# Patient Record
Sex: FEMALE | Race: AMERICAN INDIAN OR ALASKA NATIVE
[De-identification: names, ages, dates, MRNs, and addresses within clinical notes are randomized per-mention and may not be internally consistent; named-entity substitution may affect disease eponyms.]

---

## 2017-06-30 NOTE — CAT SCAN REPORT
FINAL REPORT



PROCEDURE:  CT ABDOMEN PELVIS W CON



TECHNIQUE:  Computerized axial tomography of the abdomen and

pelvis was performed after the IV injection of iodinated nonionic

contrast. 



HISTORY:  periumbical pain 



COMPARISON:  No prior studies are available for comparison.



FINDINGS:  

Visualized lower thorax: No significant abnormality.



Liver: Normal size and attenuation.



Spleen: Normal size and attenuation.



Gallbladder and biliary system: Normal.



Pancreas: Normal.



Adrenals: Normal.



Kidneys: Both kidneys have a normal size. No hydronephrosis. No

renal stones or masses.



GI tract: No obstruction. No ileus or enteritis. The cecum,

appendix and colon are normal..



Lymph nodes and mesentery: Normal. 



Vasculature: Normal.



Bladder: Normal.



Reproductive organs: No pelvic masses..



Peritoneum: Minimal fluid identified in the lower pelvis..



Musculoskeletal structures: No significant abnormality.



Other: None.  



IMPRESSION:  

There is no evidence of intestinal or urinary tract obstruction.

Ileus or enteritis. The appendix is normal. There is slight fluid

in the lower pelvis.

## 2017-06-30 NOTE — EMERGENCY DEPARTMENT REPORT
ED Abdominal Pain HPI





- General


Chief Complaint: Abdominal Pain


Stated Complaint: PAINFUL URINATION


Time Seen by Provider: 06/30/17 01:43


Source: patient


Mode of arrival: Ambulatory


Limitations: No Limitations





- History of Present Illness


Initial Comments: 





Pt is a 41 yr old female with a h/o HTN, gastric bypass, hysterectomy, and 

multiple abdominal surgeries who presents with abdominal pain. Pt reports a 

dull periumbical pain for the past two days with associated nausea and 

decreased apetite. Pt is having flatus and normal BM's. Pt also reports vaginal 

discharge that is different from her normal vaginal discharge. Pt is sexually 

active with one partner, no h/o STD. Otherwise no fevers, chills, HA,dizziness, 

vomiting, diarrhea, recent antibiotic use, SOB, CP, trauma, recent surgery, 

trauma, travel, or sick contacts


Severity scale (0 -10): 5





- Related Data


 Home Medications











 Medication  Instructions  Recorded  Confirmed  Last Taken


 


ALBUTEROL Inhaler 2 puff IH Q4H PRN 06/29/17 06/29/17 Unknown


 


Advair Diskus 500-50 mcg 2 puff IH BID 06/29/17 06/29/17 Unknown


 


Ambien 6.5 mg PO HS 06/29/17 06/29/17 Unknown


 


Cyanocobalamin 1 ml IM Q2W 06/29/17 06/29/17 Unknown


 


Flonase 1 puff INTRANASAL DAILY 06/29/17 06/29/17 Unknown


 


Labetalol 100 mg PO BID 06/29/17 06/29/17 Unknown


 


PROzac 40 mg PO DAILY 06/29/17 06/29/17 Unknown


 


Singulair 10 mg PO DAILY 06/29/17 06/29/17 Unknown


 


Xanax TAB 1 mg PO HS PRN 06/29/17 06/29/17 Unknown








 Previous Rx's











 Medication  Instructions  Recorded  Last Taken  Type


 


metroNIDAZOLE [Flagyl] 500 mg PO Q12HR #14 tab 06/30/17 Unknown Rx











 Allergies











Allergy/AdvReac Type Severity Reaction Status Date / Time


 


amlodipine besylate Allergy  Swelling Verified 06/29/17 22:11





[From Norvasc]     


 


aspirin Allergy  Unknown Verified 06/29/17 22:12


 


lisinopril Allergy  Swelling Verified 06/29/17 22:10


 


loratadine [From Claritin] Allergy  Angioedema Verified 06/29/17 22:11


 


nebivolol HCl [From Bystolic] Allergy  Swelling Verified 06/29/17 22:12


 


topiramate [From Topamax] Allergy  Headache Verified 06/29/17 22:10














ED Review of Systems


ROS: 


Stated complaint: PAINFUL URINATION


Other details as noted in HPI





Comment: All other systems reviewed and negative





ED Past Medical Hx





- Past Medical History


Previous Medical History?: Yes


Hx Hypertension: Yes


Hx Asthma: Yes


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





- Surgical History


Additional Surgical History: TUBAL LIGATION, GASTRIC BYPASS,HYSTERECTOMY, 

CERVICAL ABLATION





- Social History


Smoking Status: Never Smoker


Substance Use Type: None





- Medications


Home Medications: 


 Home Medications











 Medication  Instructions  Recorded  Confirmed  Last Taken  Type


 


ALBUTEROL Inhaler 2 puff IH Q4H PRN 06/29/17 06/29/17 Unknown History


 


Advair Diskus 500-50 mcg 2 puff IH BID 06/29/17 06/29/17 Unknown History


 


Ambien 6.5 mg PO HS 06/29/17 06/29/17 Unknown History


 


Cyanocobalamin 1 ml IM Q2W 06/29/17 06/29/17 Unknown History


 


Flonase 1 puff INTRANASAL DAILY 06/29/17 06/29/17 Unknown History


 


Labetalol 100 mg PO BID 06/29/17 06/29/17 Unknown History


 


PROzac 40 mg PO DAILY 06/29/17 06/29/17 Unknown History


 


Singulair 10 mg PO DAILY 06/29/17 06/29/17 Unknown History


 


Xanax TAB 1 mg PO HS PRN 06/29/17 06/29/17 Unknown History


 


metroNIDAZOLE [Flagyl] 500 mg PO Q12HR #14 tab 06/30/17  Unknown Rx














ED Physical Exam





- General


Limitations: No Limitations


General appearance: alert, in no apparent distress





- Head


Head exam: Present: atraumatic, normocephalic





- Eye


Eye exam: Present: normal appearance, PERRL, EOMI


Pupils: Present: normal accommodation





- ENT


ENT exam: Present: mucous membranes moist





- Neck


Neck exam: Present: normal inspection





- Respiratory


Respiratory exam: Present: normal lung sounds bilaterally.  Absent: respiratory 

distress, wheezes, rales, rhonchi, stridor





- Cardiovascular


Cardiovascular Exam: Present: regular rate, normal rhythm.  Absent: systolic 

murmur, diastolic murmur, rubs, gallop





- GI/Abdominal


GI/Abdominal exam: Present: soft, tenderness (mild periumbilical), normal bowel 

sounds.  Absent: distended, guarding, rebound, rigid





- 


External exam: Present: normal external exam.  Absent: erythema, swelling, 

lesions, lacerations, bleeding


Speculum exam: Present: vaginal discharge (yellowish green, moderate amount, 

fishy odor).  Absent: cervical discharge (Patient has no cervix)


Bi-manual exam: Present: normal bi-manual exam.  Absent: adnexal tenderness, 

uterine tenderness





- Extremities Exam


Extremities exam: Present: normal inspection





- Back Exam


Back exam: Present: normal inspection





- Neurological Exam


Neurological exam: Present: alert, oriented X3





- Psychiatric


Psychiatric exam: Present: normal affect, normal mood





- Skin


Skin exam: Present: warm, dry, intact, normal color.  Absent: rash





ED Course


 Vital Signs











  06/29/17 06/30/17





  22:00 01:47


 


Temperature 98.5 F 


 


Pulse Rate 63 63


 


Respiratory 16 17





Rate  


 


Blood Pressure 152/100 


 


Blood Pressure 152/100 146/86





[Left]  


 


O2 Sat by Pulse 100 100





Oximetry  














ED Medical Decision Making





- Lab Data


Result diagrams: 


 06/29/17 22:34





 06/29/17 22:34





- Radiology Data


Radiology results: report reviewed





CT abdomen and pelvis: Slight fluid in the pelvis otherwise no intra-abdominal 

abnormality seen.





- Medical Decision Making





Patient has a moderate amount of yellow green discharge, will treat for G/C, 

trichomona and BV





Urine noted to have greater than 108 WBCs with no leuk esterase or nitrite, his 

WBC is reflective of the yellow green vaginal discharge


Critical care attestation.: 


If time is entered above; I have spent that time in minutes in the direct care 

of this critically ill patient, excluding procedure time.








ED Disposition


Clinical Impression: 


 Abdominal pain, Vaginitis





Disposition: DC-01 TO HOME OR SELFCARE


Is pt being admited?: No


Condition: Stable


Instructions:  Abdominal Pain (ED), Vaginitis (ED)


Prescriptions: 


metroNIDAZOLE [Flagyl] 500 mg PO Q12HR #14 tab


Referrals: 


PRIMARY CARE,MD [Primary Care Provider] - 3-5 Days

## 2018-05-08 ENCOUNTER — HOSPITAL ENCOUNTER (OUTPATIENT)
Dept: HOSPITAL 5 - GIO | Age: 43
Discharge: HOME | End: 2018-05-08
Attending: SPECIALIST
Payer: MEDICAID

## 2018-05-08 VITALS — DIASTOLIC BLOOD PRESSURE: 94 MMHG | SYSTOLIC BLOOD PRESSURE: 155 MMHG

## 2018-05-08 DIAGNOSIS — Z98.51: ICD-10-CM

## 2018-05-08 DIAGNOSIS — Z98.890: ICD-10-CM

## 2018-05-08 DIAGNOSIS — K21.0: ICD-10-CM

## 2018-05-08 DIAGNOSIS — J45.909: ICD-10-CM

## 2018-05-08 DIAGNOSIS — Z86.73: ICD-10-CM

## 2018-05-08 DIAGNOSIS — Z88.5: ICD-10-CM

## 2018-05-08 DIAGNOSIS — G47.33: ICD-10-CM

## 2018-05-08 DIAGNOSIS — K95.89: Primary | ICD-10-CM

## 2018-05-08 DIAGNOSIS — Z98.0: ICD-10-CM

## 2018-05-08 DIAGNOSIS — E11.9: ICD-10-CM

## 2018-05-08 DIAGNOSIS — Z90.710: ICD-10-CM

## 2018-05-08 DIAGNOSIS — Z88.6: ICD-10-CM

## 2018-05-08 DIAGNOSIS — I10: ICD-10-CM

## 2018-05-08 DIAGNOSIS — Z88.8: ICD-10-CM

## 2018-05-08 PROCEDURE — 43235 EGD DIAGNOSTIC BRUSH WASH: CPT

## 2018-05-08 NOTE — ANESTHESIA CONSULTATION
Anesthesia Consult and Med Hx


Date of service: 05/08/18





- Airway


Anesthetic Teeth Evaluation: Good


ROM Head & Neck: Adequate


Mental/Hyoid Distance: Adequate


Mallampati Class: Class II


Intubation Access Assessment: Probably Good





- Pulmonary Exam


CTA: Yes





- Cardiac Exam


Cardiac Exam: RRR





- Pre-Operative Health Status


ASA Pre-Surgery Classification: ASA3


Proposed Anesthetic Plan: MAC





- Pulmonary


Hx Asthma: Yes


Hx Sleep Apnea: Yes





- Cardiovascular System


Hx Hypertension: Yes





- Central Nervous System


CVA: Yes (TIA)





- Gastrointestinal


Hx Gastroesophageal Reflux Disease: Yes